# Patient Record
Sex: MALE | Race: WHITE
[De-identification: names, ages, dates, MRNs, and addresses within clinical notes are randomized per-mention and may not be internally consistent; named-entity substitution may affect disease eponyms.]

---

## 2018-02-01 ENCOUNTER — HOSPITAL ENCOUNTER (OUTPATIENT)
Dept: HOSPITAL 92 - MRI | Age: 83
Discharge: HOME | End: 2018-02-01
Attending: OTOLARYNGOLOGY
Payer: MEDICARE

## 2018-02-01 DIAGNOSIS — R43.0: Primary | ICD-10-CM

## 2018-02-01 DIAGNOSIS — I73.9: ICD-10-CM

## 2018-02-01 LAB — ESTIMATED GFR-MDRD - POC: 71

## 2018-02-01 PROCEDURE — 70553 MRI BRAIN STEM W/O & W/DYE: CPT

## 2018-02-01 PROCEDURE — 82565 ASSAY OF CREATININE: CPT

## 2018-02-01 PROCEDURE — A9579 GAD-BASE MR CONTRAST NOS,1ML: HCPCS

## 2018-02-01 NOTE — MRI
BRAIN MRI WITH AND WITHOUT CONTRAST:

 

Date: 2-1-18 

 

Comparison: 3-11-15

 

History: Prior stroke, anosmia, history of tremor. 

 

Technique: Multiplanar multisequence MR imaging of the brain obtained with and without contrast using
 an internal auditory canal protocol. 

 

FINDINGS: 

The diffusion weighted imaging demonstrates no evidence for acute infarction. 

 

Arterial flow voids at the axial level of the skull base appear grossly unremarkable. 

 

There is a T1 and T2 hyperintense scalp lesion in the right frontal region, stable, evidence of a sta
ble lipoma. 

 

There is extensive periventricular deep and subcortical white matter T2 and FLAIR hyperintensity, selena
dence of significant small vessel disease. 

 

Imaged paranasal sinuses/mastoid air cells appear well aerated. 

 

Thin section T2 weighted imaging through the skull base demonstrates normal signal intensity in the r
egion of the cerebellopontine angle, AIC, cochlea, vestibule, and semicircular canals bilaterally. 

 

Thin section post contrast imaging at the level of the skull base demonstrates no abnormal enhancemen
t within the CP angle, AIC, cochlea, vestibule, or semicircular canals on either side. Whole brain po
st contrast imaging is unremarkable. There is no mass in the region of the cribriform plate. 

 

IMPRESSION: 

Small vessel disease and cerebral volume loss. No acute findings. 

 

POS: [facility]

## 2018-12-05 ENCOUNTER — HOSPITAL ENCOUNTER (EMERGENCY)
Dept: HOSPITAL 92 - ERS | Age: 83
Discharge: ANOTHER HOSPITAL | End: 2018-12-05
Payer: MEDICARE

## 2018-12-05 DIAGNOSIS — Z87.891: ICD-10-CM

## 2018-12-05 DIAGNOSIS — R79.89: ICD-10-CM

## 2018-12-05 DIAGNOSIS — Z85.821: ICD-10-CM

## 2018-12-05 DIAGNOSIS — R55: Primary | ICD-10-CM

## 2018-12-05 DIAGNOSIS — G20: ICD-10-CM

## 2018-12-05 LAB
ALBUMIN SERPL BCG-MCNC: 3.7 G/DL (ref 3.4–4.8)
ALP SERPL-CCNC: 71 U/L (ref 40–150)
ALT SERPL W P-5'-P-CCNC: 8 U/L (ref 8–55)
ANION GAP SERPL CALC-SCNC: 15 MMOL/L (ref 10–20)
AST SERPL-CCNC: 20 U/L (ref 5–34)
BILIRUB SERPL-MCNC: 0.5 MG/DL (ref 0.2–1.2)
BUN SERPL-MCNC: 20 MG/DL (ref 8.4–25.7)
CALCIUM SERPL-MCNC: 9.3 MG/DL (ref 7.8–10.44)
CHLORIDE SERPL-SCNC: 104 MMOL/L (ref 98–107)
CK MB SERPL-MCNC: 2.1 NG/ML (ref 0–6.6)
CK MB SERPL-MCNC: 3.3 NG/ML (ref 0–6.6)
CO2 SERPL-SCNC: 21 MMOL/L (ref 23–31)
CREAT CL PREDICTED SERPL C-G-VRATE: 0 ML/MIN (ref 70–130)
GLOBULIN SER CALC-MCNC: 3.1 G/DL (ref 2.4–3.5)
GLUCOSE SERPL-MCNC: 127 MG/DL (ref 83–110)
HGB BLD-MCNC: 10.2 G/DL (ref 14–18)
MACROCYTES BLD QL SMEAR: (no result) (100X)
MCH RBC QN AUTO: 34.9 PG (ref 27–31)
MCV RBC AUTO: 113 FL (ref 78–98)
MDIFF COMPLETE?: YES
PLATELET # BLD AUTO: 170 THOU/UL (ref 130–400)
POIKILOCYTOSIS BLD QL SMEAR: (no result) (100X)
POTASSIUM SERPL-SCNC: 4.3 MMOL/L (ref 3.5–5.1)
RBC # BLD AUTO: 2.93 MILL/UL (ref 4.7–6.1)
SODIUM SERPL-SCNC: 136 MMOL/L (ref 136–145)
TROPONIN I SERPL DL<=0.01 NG/ML-MCNC: 0.28 NG/ML (ref ?–0.03)
TROPONIN I SERPL DL<=0.01 NG/ML-MCNC: 0.44 NG/ML (ref ?–0.03)
WBC # BLD AUTO: 8.8 THOU/UL (ref 4.8–10.8)

## 2018-12-05 PROCEDURE — 93005 ELECTROCARDIOGRAM TRACING: CPT

## 2018-12-05 PROCEDURE — 82553 CREATINE MB FRACTION: CPT

## 2018-12-05 PROCEDURE — 85025 COMPLETE CBC W/AUTO DIFF WBC: CPT

## 2018-12-05 PROCEDURE — 36415 COLL VENOUS BLD VENIPUNCTURE: CPT

## 2018-12-05 PROCEDURE — 80053 COMPREHEN METABOLIC PANEL: CPT

## 2018-12-05 PROCEDURE — 71045 X-RAY EXAM CHEST 1 VIEW: CPT

## 2018-12-05 PROCEDURE — 84484 ASSAY OF TROPONIN QUANT: CPT

## 2018-12-05 NOTE — RAD
SINGLE VIEW OF THE CHEST:

 

Comparison: None. 

 

History: Chest pain, near syncope. 

 

FINDINGS:

Single view of the chest shows a normal sized cardiomediastinal silhouette. There is no evidence of c
onsolidation, mass, or pleural effusion. The bones are unremarkable.

 

IMPRESSION:

No evidence of acute cardiopulmonary disease.

 

POS: [facility]

## 2018-12-08 NOTE — EKG
Test Reason : 

Blood Pressure : ***/*** mmHG

Vent. Rate : 086 BPM     Atrial Rate : 086 BPM

   P-R Int : 156 ms          QRS Dur : 088 ms

    QT Int : 378 ms       P-R-T Axes : 058 033 022 degrees

   QTc Int : 452 ms

 

Normal sinus rhythm

Nonspecific ST abnormality

Abnormal ECG

 

Confirmed by TYLER CABRALES DO ([id number]),  CURT ANGEL ([id number]) on 12/8/2018 1:47:45 PM

 

Referred By:             Confirmed By:TYLER CABRALES DO